# Patient Record
Sex: MALE | Race: OTHER | NOT HISPANIC OR LATINO
[De-identification: names, ages, dates, MRNs, and addresses within clinical notes are randomized per-mention and may not be internally consistent; named-entity substitution may affect disease eponyms.]

---

## 2023-05-02 PROBLEM — Z00.00 ENCOUNTER FOR PREVENTIVE HEALTH EXAMINATION: Status: ACTIVE | Noted: 2023-05-02

## 2023-05-03 ENCOUNTER — RESULT REVIEW (OUTPATIENT)
Age: 33
End: 2023-05-03

## 2023-05-03 ENCOUNTER — TRANSCRIPTION ENCOUNTER (OUTPATIENT)
Age: 33
End: 2023-05-03

## 2023-05-03 ENCOUNTER — OUTPATIENT (OUTPATIENT)
Dept: OUTPATIENT SERVICES | Facility: HOSPITAL | Age: 33
LOS: 1 days | End: 2023-05-03

## 2023-05-03 ENCOUNTER — APPOINTMENT (OUTPATIENT)
Dept: UROLOGY | Facility: CLINIC | Age: 33
End: 2023-05-03
Payer: COMMERCIAL

## 2023-05-03 ENCOUNTER — APPOINTMENT (OUTPATIENT)
Dept: ULTRASOUND IMAGING | Facility: CLINIC | Age: 33
End: 2023-05-03
Payer: COMMERCIAL

## 2023-05-03 VITALS
HEART RATE: 78 BPM | HEIGHT: 71.26 IN | OXYGEN SATURATION: 94 % | BODY MASS INDEX: 31.44 KG/M2 | WEIGHT: 227.08 LBS | TEMPERATURE: 97.2 F | SYSTOLIC BLOOD PRESSURE: 129 MMHG | DIASTOLIC BLOOD PRESSURE: 89 MMHG

## 2023-05-03 DIAGNOSIS — Z82.49 FAMILY HISTORY OF ISCHEMIC HEART DISEASE AND OTHER DISEASES OF THE CIRCULATORY SYSTEM: ICD-10-CM

## 2023-05-03 DIAGNOSIS — Z87.891 PERSONAL HISTORY OF NICOTINE DEPENDENCE: ICD-10-CM

## 2023-05-03 DIAGNOSIS — Z72.0 TOBACCO USE: ICD-10-CM

## 2023-05-03 DIAGNOSIS — N50.811 RIGHT TESTICULAR PAIN: ICD-10-CM

## 2023-05-03 DIAGNOSIS — Z78.9 OTHER SPECIFIED HEALTH STATUS: ICD-10-CM

## 2023-05-03 PROCEDURE — 99203 OFFICE O/P NEW LOW 30 MIN: CPT

## 2023-05-03 PROCEDURE — 81003 URINALYSIS AUTO W/O SCOPE: CPT | Mod: NC,QW

## 2023-05-03 PROCEDURE — 76870 US EXAM SCROTUM: CPT | Mod: 26

## 2023-05-03 PROCEDURE — 93975 VASCULAR STUDY: CPT | Mod: 26

## 2023-05-03 RX ORDER — ACETAMINOPHEN 325 MG/1
TABLET, FILM COATED ORAL
Refills: 0 | Status: ACTIVE | COMMUNITY

## 2023-05-03 RX ORDER — DOXYCYCLINE HYCLATE 100 MG/1
100 TABLET ORAL TWICE DAILY
Qty: 14 | Refills: 0 | Status: ACTIVE | COMMUNITY
Start: 2023-05-03 | End: 1900-01-01

## 2023-05-03 NOTE — ASSESSMENT
[FreeTextEntry1] : 32 yr old male with no significant PMH presents with right testicular pain and swelling x 2 days. Exam is concerning for right epididymitis. Will treat empirically with doxycycline x 7 days while we wait for infectious workup to return. He will go to the hospital today for scrotal ultrasound. He can use NSAIDS, ice, sitz baths, scrotal support. \par \par - F/u UA, UCx, GC/CL, ureaplasma, trichomonas \par - Scrotal US \par - Doxycycline BID x 7 days

## 2023-05-03 NOTE — HISTORY OF PRESENT ILLNESS
MD at bedside, may discharge when meeting criteria   [FreeTextEntry1] : 32 yr old male presents with right testicular pain and swelling x 2 days. States pain is 4/10, tylenol does help. The area is tender to the touch. He states swelling. Subjective fever improved with Tylenol. A similar occurrence happened 1.5 months ago, which resolved after 2 days, but since it happened again it prompted him to see urology. Denies any precipitating event prior to the pain. \par \par Denies gross hematuria or urination pattern changes. \par \par He is not currently sexually active, his wife is in [place]. \par \par UA: bilirubin, trace intact blood, trace protein \par IPSS: 0, QOL: 5 \par

## 2023-05-03 NOTE — PHYSICAL EXAM
[General Appearance - Well Developed] : well developed [General Appearance - Well Nourished] : well nourished [Normal Appearance] : normal appearance [Well Groomed] : well groomed [General Appearance - In No Acute Distress] : no acute distress [Edema] : no peripheral edema [Respiration, Rhythm And Depth] : normal respiratory rhythm and effort [Exaggerated Use Of Accessory Muscles For Inspiration] : no accessory muscle use [Abdomen Soft] : soft [Abdomen Tenderness] : non-tender [Normal Station and Gait] : the gait and station were normal for the patient's age [Costovertebral Angle Tenderness] : no ~M costovertebral angle tenderness [] : no rash [No Focal Deficits] : no focal deficits [Oriented To Time, Place, And Person] : oriented to person, place, and time [Affect] : the affect was normal [Mood] : the mood was normal [Not Anxious] : not anxious [Urethral Meatus] : meatus normal [FreeTextEntry1] : Right epididymal swelling and tenderness

## 2023-05-04 LAB
APPEARANCE: CLEAR
BACTERIA: NEGATIVE /HPF
BILIRUBIN URINE: NEGATIVE
BLOOD URINE: NEGATIVE
C TRACH RRNA SPEC QL NAA+PROBE: NOT DETECTED
CAST: 0 /LPF
COLOR: NORMAL
EPITHELIAL CELLS: 0 /HPF
GLUCOSE QUALITATIVE U: NEGATIVE MG/DL
KETONES URINE: NEGATIVE MG/DL
LEUKOCYTE ESTERASE URINE: NEGATIVE
MICROSCOPIC-UA: NORMAL
N GONORRHOEA RRNA SPEC QL NAA+PROBE: NOT DETECTED
NITRITE URINE: NEGATIVE
PH URINE: 5.5
PROTEIN URINE: NEGATIVE MG/DL
RED BLOOD CELLS URINE: 1 /HPF
SOURCE AMPLIFICATION: NORMAL
SPECIFIC GRAVITY URINE: 1.03
UROBILINOGEN URINE: 0.2 MG/DL
WHITE BLOOD CELLS URINE: 0 /HPF

## 2023-05-05 ENCOUNTER — NON-APPOINTMENT (OUTPATIENT)
Age: 33
End: 2023-05-05

## 2023-05-05 LAB
BACTERIA UR CULT: NORMAL
BILIRUB UR QL STRIP: NORMAL
CLARITY UR: CLEAR
COLLECTION METHOD: NORMAL
GLUCOSE UR-MCNC: NORMAL
HCG UR QL: 0.2 EU/DL
HGB UR QL STRIP.AUTO: NORMAL
KETONES UR-MCNC: NORMAL
LEUKOCYTE ESTERASE UR QL STRIP: NORMAL
NITRITE UR QL STRIP: NORMAL
PH UR STRIP: 5.5
PROT UR STRIP-MCNC: NORMAL
SOURCE AMPLIFICATION: NORMAL
SP GR UR STRIP: 1.03
T VAGINALIS RRNA SPEC QL NAA+PROBE: NOT DETECTED

## 2023-05-10 ENCOUNTER — NON-APPOINTMENT (OUTPATIENT)
Age: 33
End: 2023-05-10

## 2023-05-10 LAB
MYCOPLASMA HOMINIS CULTURE: NEGATIVE
UREAPLASMA CULTURE: NEGATIVE

## 2024-04-21 ENCOUNTER — EMERGENCY (EMERGENCY)
Facility: HOSPITAL | Age: 34
LOS: 1 days | Discharge: HOME | End: 2024-04-21
Admitting: EMERGENCY MEDICINE
Payer: SELF-PAY

## 2024-04-21 VITALS
SYSTOLIC BLOOD PRESSURE: 133 MMHG | RESPIRATION RATE: 18 BRPM | DIASTOLIC BLOOD PRESSURE: 86 MMHG | HEART RATE: 71 BPM | OXYGEN SATURATION: 96 % | TEMPERATURE: 98 F

## 2024-04-21 DIAGNOSIS — M79.604 PAIN IN RIGHT LEG: ICD-10-CM

## 2024-04-21 DIAGNOSIS — Z83.2 FAMILY HISTORY OF DISEASES OF THE BLOOD AND BLOOD-FORMING ORGANS AND CERTAIN DISORDERS INVOLVING THE IMMUNE MECHANISM: ICD-10-CM

## 2024-04-21 DIAGNOSIS — Z87.891 PERSONAL HISTORY OF NICOTINE DEPENDENCE: ICD-10-CM

## 2024-04-21 DIAGNOSIS — M79.661 PAIN IN RIGHT LOWER LEG: ICD-10-CM

## 2024-04-21 DIAGNOSIS — M79.89 OTHER SPECIFIED SOFT TISSUE DISORDERS: ICD-10-CM

## 2024-04-21 LAB
ALBUMIN SERPL ELPH-MCNC: 4.7 G/DL — SIGNIFICANT CHANGE UP (ref 3.3–5)
ALP SERPL-CCNC: 110 U/L — SIGNIFICANT CHANGE UP (ref 40–120)
ALT FLD-CCNC: 18 U/L — SIGNIFICANT CHANGE UP (ref 10–45)
ANION GAP SERPL CALC-SCNC: 11 MMOL/L — SIGNIFICANT CHANGE UP (ref 5–17)
APTT BLD: 32.7 SEC — SIGNIFICANT CHANGE UP (ref 24.5–35.6)
AST SERPL-CCNC: 19 U/L — SIGNIFICANT CHANGE UP (ref 10–40)
BASOPHILS # BLD AUTO: 0.02 K/UL — SIGNIFICANT CHANGE UP (ref 0–0.2)
BASOPHILS NFR BLD AUTO: 0.2 % — SIGNIFICANT CHANGE UP (ref 0–2)
BILIRUB DIRECT SERPL-MCNC: <0.2 MG/DL — SIGNIFICANT CHANGE UP (ref 0–0.3)
BILIRUB INDIRECT FLD-MCNC: SIGNIFICANT CHANGE UP (ref 0.2–1)
BILIRUB SERPL-MCNC: 0.7 MG/DL — SIGNIFICANT CHANGE UP (ref 0.2–1.2)
BUN SERPL-MCNC: 15 MG/DL — SIGNIFICANT CHANGE UP (ref 7–23)
CALCIUM SERPL-MCNC: 10.3 MG/DL — SIGNIFICANT CHANGE UP (ref 8.4–10.5)
CHLORIDE SERPL-SCNC: 100 MMOL/L — SIGNIFICANT CHANGE UP (ref 96–108)
CO2 SERPL-SCNC: 26 MMOL/L — SIGNIFICANT CHANGE UP (ref 22–31)
CREAT SERPL-MCNC: 0.94 MG/DL — SIGNIFICANT CHANGE UP (ref 0.5–1.3)
EGFR: 110 ML/MIN/1.73M2 — SIGNIFICANT CHANGE UP
EOSINOPHIL # BLD AUTO: 0.04 K/UL — SIGNIFICANT CHANGE UP (ref 0–0.5)
EOSINOPHIL NFR BLD AUTO: 0.4 % — SIGNIFICANT CHANGE UP (ref 0–6)
GLUCOSE SERPL-MCNC: 90 MG/DL — SIGNIFICANT CHANGE UP (ref 70–99)
HCT VFR BLD CALC: 46.3 % — SIGNIFICANT CHANGE UP (ref 39–50)
HGB BLD-MCNC: 15.6 G/DL — SIGNIFICANT CHANGE UP (ref 13–17)
IMM GRANULOCYTES NFR BLD AUTO: 0.4 % — SIGNIFICANT CHANGE UP (ref 0–0.9)
INR BLD: 1.1 — SIGNIFICANT CHANGE UP (ref 0.85–1.18)
LYMPHOCYTES # BLD AUTO: 2.25 K/UL — SIGNIFICANT CHANGE UP (ref 1–3.3)
LYMPHOCYTES # BLD AUTO: 25.3 % — SIGNIFICANT CHANGE UP (ref 13–44)
MCHC RBC-ENTMCNC: 27.9 PG — SIGNIFICANT CHANGE UP (ref 27–34)
MCHC RBC-ENTMCNC: 33.7 GM/DL — SIGNIFICANT CHANGE UP (ref 32–36)
MCV RBC AUTO: 82.8 FL — SIGNIFICANT CHANGE UP (ref 80–100)
MONOCYTES # BLD AUTO: 0.75 K/UL — SIGNIFICANT CHANGE UP (ref 0–0.9)
MONOCYTES NFR BLD AUTO: 8.4 % — SIGNIFICANT CHANGE UP (ref 2–14)
NEUTROPHILS # BLD AUTO: 5.8 K/UL — SIGNIFICANT CHANGE UP (ref 1.8–7.4)
NEUTROPHILS NFR BLD AUTO: 65.3 % — SIGNIFICANT CHANGE UP (ref 43–77)
NRBC # BLD: 0 /100 WBCS — SIGNIFICANT CHANGE UP (ref 0–0)
PLATELET # BLD AUTO: 235 K/UL — SIGNIFICANT CHANGE UP (ref 150–400)
POTASSIUM SERPL-MCNC: 4.3 MMOL/L — SIGNIFICANT CHANGE UP (ref 3.5–5.3)
POTASSIUM SERPL-SCNC: 4.3 MMOL/L — SIGNIFICANT CHANGE UP (ref 3.5–5.3)
PROT SERPL-MCNC: 7.6 G/DL — SIGNIFICANT CHANGE UP (ref 6–8.3)
PROTHROM AB SERPL-ACNC: 12.5 SEC — SIGNIFICANT CHANGE UP (ref 9.5–13)
RBC # BLD: 5.59 M/UL — SIGNIFICANT CHANGE UP (ref 4.2–5.8)
RBC # FLD: 12.4 % — SIGNIFICANT CHANGE UP (ref 10.3–14.5)
SODIUM SERPL-SCNC: 137 MMOL/L — SIGNIFICANT CHANGE UP (ref 135–145)
WBC # BLD: 8.9 K/UL — SIGNIFICANT CHANGE UP (ref 3.8–10.5)
WBC # FLD AUTO: 8.9 K/UL — SIGNIFICANT CHANGE UP (ref 3.8–10.5)

## 2024-04-21 PROCEDURE — 99284 EMERGENCY DEPT VISIT MOD MDM: CPT

## 2024-04-21 PROCEDURE — 93971 EXTREMITY STUDY: CPT

## 2024-04-21 PROCEDURE — 85730 THROMBOPLASTIN TIME PARTIAL: CPT

## 2024-04-21 PROCEDURE — 99284 EMERGENCY DEPT VISIT MOD MDM: CPT | Mod: 25

## 2024-04-21 PROCEDURE — 80048 BASIC METABOLIC PNL TOTAL CA: CPT

## 2024-04-21 PROCEDURE — 85610 PROTHROMBIN TIME: CPT

## 2024-04-21 PROCEDURE — 36415 COLL VENOUS BLD VENIPUNCTURE: CPT

## 2024-04-21 PROCEDURE — 85025 COMPLETE CBC W/AUTO DIFF WBC: CPT

## 2024-04-21 PROCEDURE — 80076 HEPATIC FUNCTION PANEL: CPT

## 2024-04-21 PROCEDURE — 93971 EXTREMITY STUDY: CPT | Mod: 26,RT

## 2024-04-21 NOTE — ED ADULT NURSE NOTE - NSFALLUNIVINTERV_ED_ALL_ED
Bed/Stretcher in lowest position, wheels locked, appropriate side rails in place/Call bell, personal items and telephone in reach/Instruct patient to call for assistance before getting out of bed/chair/stretcher/Non-slip footwear applied when patient is off stretcher/[place] to call system/Physically safe environment - no spills, clutter or unnecessary equipment/Purposeful proactive rounding/Room/bathroom lighting operational, light cord in reach

## 2024-04-21 NOTE — ED ADULT NURSE NOTE - OBJECTIVE STATEMENT
Pt is a 32yo male presenting to ED c/o leg pain. Pt reports 2 days of right calf pain that has partially relieved with aspirin. Upon assessment pt right calf is firm, hot to touch, and tender to palpation. Pt is A&Ox4, breathing even and unlabored.

## 2024-04-21 NOTE — ED PROVIDER NOTE - CLINICAL SUMMARY MEDICAL DECISION MAKING FREE TEXT BOX
pt c/o r calf pain and swelling, on exam small palpable cord - ? superficial thrombophlebitis vs dvt, neurovascular intact, well perfused, doppler neg, stable for dc, to take asa and utilize warm compresses, f/u w/pmd, pt understands and agrees w/plan, strict return precautions given

## 2024-04-21 NOTE — ED PROVIDER NOTE - PATIENT PORTAL LINK FT
You can access the FollowMyHealth Patient Portal offered by [facility] by registering at the following website: [email or web address]. By joining [email or web address] FollowMyHealth portal, you will also be able to view your health information using other applications (apps) compatible with our system.

## 2024-04-21 NOTE — ED ADULT TRIAGE NOTE - CHIEF COMPLAINT QUOTE
+ RLE redness, swelling, pain x 3 days  Notes 7+ hours car ride last monday, oth denies long travels. Past smoker. Denies sob/cp.

## 2024-04-21 NOTE — ED PROVIDER NOTE - OBJECTIVE STATEMENT
The pt is a 32 y/o M, who presents to ED c/o R leg pain and swelling x few d. FH of DVTs. Pt states pain and swelling to R lower leg, constant but aggravated w/touching and moving, pain is 0/10 at rest but 5/10 w/moving, took asa w/some relief. + recent 7hr car ride. Denies cp, sob, n/v/d, abd pain, fevers, chills, numbness or tingling to toes, decreased ROM.

## 2024-04-21 NOTE — ED PROVIDER NOTE - NSFOLLOWUPINSTRUCTIONS_ED_ALL_ED_FT
Thrombophlebitis  use compression stockings, elevate leg, take ibuprofen 800 mg every 8 hrs, use warm compresses, follow up with pmd  Lower body with close-ups showing a normal vein and a vein with a blood clot.   Thrombophlebitis is a condition in which a blood clot and inflammation occur inside a vein. This can happen in the arms, legs, or torso. Thrombophlebitis may involve superficial veins, deep veins, or both. Superficial veins are close to the surface of the body and are part of the superficial venous system. Veins that are deeper inside the body are part of the deep venous system.  When this condition happens in a superficial vein (superficial thrombophlebitis), it is usually not serious.However, when the condition happens in a vein that is part of the deep venous system (deep vein thrombosis, DVT), it can cause serious problems.  What are the causes?  This condition may be caused by:  Infection, injury, or trauma to a vein.  Inflammation of the veins.  Medical conditions that can cause blood to clot more easily (hypercoagulable state).  Backing up, or reflux, of blood flow through the veins (chronic venous insufficiency or venous stasis).  What increases the risk?  The following factors may make you more likely to develop this condition:  Having a long, thin tube (catheter) put in a vein, such as a central line, port, or IV catheter.  Getting certain medicines through a catheter that can irritate the vein.  Pregnancy or having recently given birth.  Cancer.  Obesity.  Taking oral contraceptive pills (OCPs) or hormone therapy (HT) medicines.  Spasms of veins.  Immobilization, or not moving the limbs for prolonged periods.  What are the signs or symptoms?  The main symptoms of this condition are:  Swelling and pain in an arm or leg. If the affected vein is in the leg, you may feel pain while standing or walking.  Warmth or redness in an arm or leg.  Tenderness in the affected area when it is touched.  Other symptoms include:  Low-grade fever.  Muscle aches.  A bulging or hard vein (venous distension).  In some cases, there are no symptoms.  How is this diagnosed?  This condition may be diagnosed based on:  Your symptoms and medical history.  A physical exam.  Tests, such as a test that uses sound waves to make images (duplex ultrasound).  How is this treated?  Treatment depends on how severe the condition is and which area of the body is affected. Treatment may include:  Applying a warm compress or heating pad to affected areas. This may need to be repeated several times a day.  Moving the affected limb. For example, you may need to start doing walking exercises right away. You will also be encouraged to continue your usual daily activities.  Raising (elevating) the affected arm or leg above the level of your heart.  Medicines, such as:  Anti-inflammatory medicines, such as ibuprofen.  Blood thinners (anticoagulants) or anti-platelet drugs such as aspirin.  Removing an IV or central line that may be causing the problem.  Wearing compression stockings to help prevent blood clots and reduce swelling in your legs.  Follow these instructions at home:  Medicines  Take over-the-counter and prescription medicines only as told by your health care provider.  If you are taking blood thinners:  Talk with your health care provider before you take any medicines that contain aspirin or NSAIDs, such as ibuprofen. These medicines increase your risk for dangerous bleeding.  Take your medicine exactly as told, at the same time every day.  Avoid activities that could cause injury or bruising, and follow instructions about how to prevent falls.  Wear a medical alert bracelet or carry a card that lists what medicines you take.  Managing pain, stiffness, and swelling  A heating pad for use on the affected area.   If directed, apply heat to the affected area as often as told by your health care provider. Use the heat source that your health care provider recommends, such as a moist heat pack or a heating pad.  Place a towel between your skin and the heat source.  Leave the heat on for 20–30 minutes.  Remove the heat if your skin turns bright red. This is especially important if you are unable to feel pain, heat, or cold. You have a greater risk of getting burned.  Elevate the affected area above the level of your heart while you are sitting or lying down.  Activity  Return to your normal activities as told by your health care provider. Ask your health care provider what activities are safe for you.  Avoid sitting for a long time without moving. Get up to take short walks every 1–2 hours. This is important to improve blood flow and breathing. Ask for help if you feel weak or unsteady.  Do exercises as told by your health care provider.  Rest as told by your health care provider.  General instructions  Drink enough fluid to keep your urine pale yellow.  Wear compression stockings as told by your health care provider.  Do not use any products that contain nicotine or tobacco. These products include cigarettes, chewing tobacco, and vaping devices, such as e-cigarettes. If you need help quitting, ask your health care provider.  Keep all follow-up visits. This is important.  Contact a health care provider if:  You miss a dose of your blood thinner, if applicable.  Your symptoms do not improve.  You have unusual bruising.  You have nausea, vomiting, or diarrhea that lasts for more than a day.  Get help right away if:  You are breathing fast or have chest pain.  You have blood in your vomit, urine, or stool.  You have severe pain in your affected arm or leg or new pain in any arm or leg.  You have light-headedness, dizziness, a severe headache, or confusion.  These symptoms may represent a serious problem that is an emergency. Do not wait to see if the symptoms will go away. Get medical help right away. Call your local emergency services (911 in the [place] Do not drive yourself to the hospital.  Summary  Thrombophlebitis is a condition in which a blood clot forms in a vein, causing inflammation and often pain. This can happen in both superficial and deep veins.  The main symptom of this condition is swelling and pain around the affected vein. Tenderness and redness may also be present.  Treatment may include warm compresses, compression stockings, anti-inflammatory medicines, or blood thinners.  Make sure you take all medicines, especially blood thinners, as instructed and keep all follow-up visits to ensure proper healing of the vein.

## 2024-04-21 NOTE — ED PROVIDER NOTE - MUSCULOSKELETAL, MLM
Spine appears normal, range of motion is not limited, no muscle or joint tenderness; R LE: + palpable cord to poster mid calf, + tend, soft compartments, + light touch, pedal pulse 2+, FROM, no bony tend, no breaks in skin

## 2024-04-21 NOTE — ED ADULT NURSE NOTE - DOES PATIENT HAVE ADVANCE DIRECTIVE
Vascular Access Team    Date of Insertion: 11/09/2022  Arm Circumference: 29 cm  Internal length: 43 cm  External Length: 0 cm  Vein Occupancy: 26 %  Reason for PICC: critical care  Labs within procedure parameters.    Ultrasound images uploaded to PACS and viewable in the EMR - yes  Ultrasound images printed and placed in paper chart - no     BARD Power PICC Lot #: [id number]  Expiration Date: 09/30/2023    Chart reviewed for provider order, indications and contraindications for peripherally inserted central catheter. Labs reviewed and are within procedure parameters. Nursing care plan includes knowledge deficit, potential for pain and anxiety, potential for infection. POC: patient teaching, comfort measures, and sterile technique using five step bundle to prevent CR-BSI. Risks and benefits of procedure explained to patient emphasizing risk of central bloodstream infection. Questions answered. Patient verbalized understanding. Consent signed by patient.     Maximum barrier precautions and aseptic technique used. 2ml of 1% lidocaine injected intradermally to insertion site at LUE. 21 gauge micro-introducer needle used to access Basilic vein. Modified Seldinger technique used to insert 5 fr double lumen 43 cm catheter with brisk blood return noted through each lumen. Each lumen flushed with 10 ml normal saline using pulsatile method without resistance. PICC secured with Statlock at 0 cm rupert. Biopatch and Tegaderm applied over insertion site. CXR ordered to confirm picc placement.     # of attempts: one      Time out and LDA documentation completed. Patient condition and procedure outcome reported to unit RN and /or ordering provider via this post-procedure note.     Patient educated re: PICC care. Written post-procedure instructions given to patient.        Unknown